# Patient Record
Sex: FEMALE | Race: WHITE | NOT HISPANIC OR LATINO
[De-identification: names, ages, dates, MRNs, and addresses within clinical notes are randomized per-mention and may not be internally consistent; named-entity substitution may affect disease eponyms.]

---

## 2023-01-12 ENCOUNTER — APPOINTMENT (OUTPATIENT)
Dept: ORTHOPEDIC SURGERY | Facility: CLINIC | Age: 78
End: 2023-01-12

## 2023-01-16 ENCOUNTER — APPOINTMENT (OUTPATIENT)
Dept: ORTHOPEDIC SURGERY | Facility: CLINIC | Age: 78
End: 2023-01-16
Payer: MEDICARE

## 2023-01-16 DIAGNOSIS — Z87.891 PERSONAL HISTORY OF NICOTINE DEPENDENCE: ICD-10-CM

## 2023-01-16 DIAGNOSIS — E78.00 PURE HYPERCHOLESTEROLEMIA, UNSPECIFIED: ICD-10-CM

## 2023-01-16 DIAGNOSIS — Z78.9 OTHER SPECIFIED HEALTH STATUS: ICD-10-CM

## 2023-01-16 DIAGNOSIS — I10 ESSENTIAL (PRIMARY) HYPERTENSION: ICD-10-CM

## 2023-01-16 DIAGNOSIS — I51.9 HEART DISEASE, UNSPECIFIED: ICD-10-CM

## 2023-01-16 PROBLEM — Z00.00 ENCOUNTER FOR PREVENTIVE HEALTH EXAMINATION: Status: ACTIVE | Noted: 2023-01-16

## 2023-01-16 PROCEDURE — 99203 OFFICE O/P NEW LOW 30 MIN: CPT

## 2023-01-16 PROCEDURE — 73560 X-RAY EXAM OF KNEE 1 OR 2: CPT | Mod: RT

## 2023-01-16 RX ORDER — AMLODIPINE BESYLATE AND BENAZEPRIL HYDROCHLORIDE 5; 20 MG/1; MG/1
5-20 CAPSULE ORAL
Qty: 90 | Refills: 0 | Status: ACTIVE | COMMUNITY
Start: 2023-01-09

## 2023-01-16 RX ORDER — ATORVASTATIN CALCIUM 10 MG/1
10 TABLET, FILM COATED ORAL
Qty: 90 | Refills: 0 | Status: COMPLETED | COMMUNITY
Start: 2022-10-17

## 2023-01-16 RX ORDER — ATORVASTATIN CALCIUM 10 MG/1
10 TABLET, FILM COATED ORAL
Refills: 0 | Status: ACTIVE | COMMUNITY

## 2023-01-16 RX ORDER — PEN NEEDLE, DIABETIC 32GX 5/32"
32G X 4 MM NEEDLE, DISPOSABLE MISCELLANEOUS
Qty: 100 | Refills: 0 | Status: ACTIVE | COMMUNITY
Start: 2022-11-23

## 2023-01-16 RX ORDER — PREDNISONE 20 MG/1
20 TABLET ORAL
Qty: 18 | Refills: 0 | Status: ACTIVE | COMMUNITY
Start: 2022-09-06

## 2023-01-16 RX ORDER — INSULIN DEGLUDEC INJECTION 200 U/ML
200 INJECTION, SOLUTION SUBCUTANEOUS
Qty: 9 | Refills: 0 | Status: ACTIVE | COMMUNITY
Start: 2022-12-29

## 2023-01-16 RX ORDER — METFORMIN HYDROCHLORIDE 500 MG/1
500 TABLET, COATED ORAL
Qty: 360 | Refills: 0 | Status: ACTIVE | COMMUNITY
Start: 2022-12-29

## 2023-01-16 RX ORDER — BUPROPION HYDROCHLORIDE 75 MG/1
75 TABLET, FILM COATED ORAL
Qty: 60 | Refills: 0 | Status: ACTIVE | COMMUNITY
Start: 2023-01-09

## 2023-01-16 RX ORDER — ESCITALOPRAM OXALATE 20 MG/1
20 TABLET ORAL
Qty: 90 | Refills: 0 | Status: ACTIVE | COMMUNITY
Start: 2023-01-09

## 2023-01-18 NOTE — ASSESSMENT
[FreeTextEntry1] : Pt told to do ice, rest, elevation, nsaids. f/u in two weeks \par \par Lengthy discussion regarding options was had with the patient. Nonsurgical options including but not limited to cortisone, viscosupplementation, anti-inflammatory medications, activity modification, obtaining/maintaining a healthy BMI, bracing, and icing were reviewed. Surgical options including but not limited to arthroscopy, and joint replacement were discussed as was risks, benefits and alternatives. All questions were answered.\par \par Entered by Amanda Tony as acting Scribe.\par Dr. Alexander Attestation\par The documentation recorded by the scribe, in my presence, accurately reflects the service I, Dr. Alexander, personally performed, and the decisions made by me with my edits as appropriate.\par \par

## 2023-01-18 NOTE — HISTORY OF PRESENT ILLNESS
[8] : 8 [6] : 6 [Dull/Aching] : dull/aching [Radiating] : radiating [Constant] : constant [Leisure] : leisure [Rest] : rest [Meds] : meds [Walking] : walking [de-identified] : 01/16/23: Pt is here for her Rt knee. States she fell and twisted her knee on 01/08/23. States she has been taking Tylenol and Advil for the pain. Denies any imaging. States she has been using a knee brace. States she has radiating pain going up her Rt leg. [] : no [FreeTextEntry1] : Rt knee [FreeTextEntry7] : up her Rt leg

## 2023-01-30 ENCOUNTER — APPOINTMENT (OUTPATIENT)
Dept: ORTHOPEDIC SURGERY | Facility: CLINIC | Age: 78
End: 2023-01-30
Payer: MEDICARE

## 2023-01-30 DIAGNOSIS — S83.411A SPRAIN OF MEDIAL COLLATERAL LIGAMENT OF RIGHT KNEE, INITIAL ENCOUNTER: ICD-10-CM

## 2023-01-30 DIAGNOSIS — M17.11 UNILATERAL PRIMARY OSTEOARTHRITIS, RIGHT KNEE: ICD-10-CM

## 2023-01-30 PROCEDURE — 99213 OFFICE O/P EST LOW 20 MIN: CPT

## 2023-01-30 NOTE — DISCUSSION/SUMMARY
[de-identified] : Recommended physical therapy, but the pt declined\par Patient's pain has decreased since previous visit and her ROM slightly improved \par pt will remain out of work at this time and continue conservative treatment\par follow up 3 weeks \par \par Entered by Liliana Fabian acting as Scribe. \par Dr. Alexander Attestation\par The documentation recorded by the scribe, in my presence, accurately reflects the service I, Dr. Alexander, personally performed, and the decisions made by me with my edits as appropriate.\par

## 2023-01-30 NOTE — HISTORY OF PRESENT ILLNESS
[8] : 8 [6] : 6 [Dull/Aching] : dull/aching [Radiating] : radiating [Constant] : constant [Leisure] : leisure [Rest] : rest [Meds] : meds [Walking] : walking [de-identified] : 01/16/23: Pt is here for her Rt knee. States she fell and twisted her knee on 01/08/23. States she has been taking Tylenol and Advil for the pain. Denies any imaging. States she has been using a knee brace. States she has radiating pain going up her Rt leg.\par \par 1/30/23: f/u right knee. Admits to still having a lot of pain, ambulating with walker. Doing stretches and at home HEP. D/C knee brace. ICE/HEAT w/o relief.  [] : no [FreeTextEntry1] : Rt knee [FreeTextEntry7] : up her Rt leg

## 2023-01-30 NOTE — PHYSICAL EXAM
[NL (0)] : extension 0 degrees [Right] : right knee [AP] : anteroposterior [Lateral] : lateral [There are no fractures, subluxations or dislocations. No significant abnormalities are seen] : There are no fractures, subluxations or dislocations. No significant abnormalities are seen [advanced tricompartmental OA with medial compartment narrowing and varus alignment] : advanced tricompartmental OA with medial compartment narrowing and varus alignment [advanced tricompartmental OA with lateral compartment narrowing and valgus alignment] : advanced tricompartmental OA with lateral compartment narrowing and valgus alignment [] : antalgic [TWNoteComboBox7] : flexion 120 degrees [de-identified] : extension 0 degrees

## 2023-02-20 ENCOUNTER — APPOINTMENT (OUTPATIENT)
Dept: ORTHOPEDIC SURGERY | Facility: CLINIC | Age: 78
End: 2023-02-20
